# Patient Record
Sex: FEMALE | Race: WHITE | URBAN - METROPOLITAN AREA
[De-identification: names, ages, dates, MRNs, and addresses within clinical notes are randomized per-mention and may not be internally consistent; named-entity substitution may affect disease eponyms.]

---

## 2020-07-02 ENCOUNTER — OFFICE VISIT CONVERTED (OUTPATIENT)
Dept: CARDIOLOGY | Facility: CLINIC | Age: 85
End: 2020-07-02
Attending: SPECIALIST

## 2021-05-10 NOTE — H&P
History and Physical      Patient Name: Barbara Grewal   Patient ID: [id number]   Sex: Female   YOB: 1932        Visit Date: July 2, 2020    Provider: Benjamin Delong MD   Location: [facility]   Location Address: [address] [place] [address], [facility]   [place], [place]  [id number]   Location Phone: [phone]          Chief Complaint  · Atrial fibrillation  · Weakness      History Of Present Illness  Consult requested by: ALL CARE PROVIDER NAME   Barbara Grewal is a 88 year old female recently in [facility] with shortness of breath was found to be in atrial fibrillation with a controlled heart rate. No chest pain. She complains of feeling fatigue and weakness. She has shortness of breath on exertion.   PAST MEDICAL HISTORY: Positive for diabetes. Hypertension, arthritis and atrial fibrillation.   FAMILY HISTORY: unknown.   PSYCHOSOCIAL HISTORY: Negative for depression and mood changes. Patient is . Does not drink alcohol and uses snuff.   CURRENT MEDICATIONS: include Trospium Chloride 60 mg qd; Raberprazole 20 mg bid; Montelukast 10 mg qd; Sertraline 50 mg qd; Nitrofurantoin 25 mg qd; Enalapril 2.5 mg qd; Hydroxyzine 50 mg qd; Ergocalciferol 50,000 unit q week; Janumet /1000 mg qd; ASA 81 mg qd;  mg qd; Os-anjali 1250 mg bid; Prednisone 5 mg qd; Ferrous Sulfate 325 mg qd; Eliquis 2.5 mg bid; Metoprolol Succ 265 mg 1/2 tablet qd; Levothyroxine 100 mcg qd. The dosage and frequency of the medications were reviewed with the patient.   ALLERGIES: Penicillin.   CHOLESTEROL STATUS: Unknown.       Review of Systems  · Constitutional  o Admits  o : good general health lately; fatigue  o Denies  o : recent weight changes  · Eyes  o Admits  o : blurred or double vision  · HENT  o Admits  o : hearing loss or ringing  o Denies  o : chronic sinus problem, swollen glands in neck  · Cardiovascular  o Admits  o : palpitations, swelling feet, hands, ankles; shortness of  "breath   o Denies  o : chest pain  · Respiratory  o Admits  o : asthma or wheezing  o Denies  o : COPD  · Gastrointestinal  o Denies  o : ulcers, nausea or vomiting  · Neurologic  o Denies  o : lightheaded or dizzy, stroke, headaches  · Musculoskeletal  o Admits  o : joint pain  o Denies  o : back pain  · Endocrine  o Admits  o : diabetes and hypothyroidism; excessive thirst or urination  o Denies  o : heat or cold intolerance  · Heme-Lymph  o Denies  o : bleeding or bruising tendency, anemia      Vitals  Date Time BP Position Site L\R Cuff Size HR RR TEMP (F) WT  HT  BMI kg/m2 BSA m2 O2 Sat HC       07/02/2020 01:37 /57 Sitting    57 - R   159lbs 0oz 5'  1\" 30.04 1.76           Physical Examination  · Constitutional  o Appearance  o : Awake, alert, cooperative, pleasant.  · Eyes  o Pupils and Irises  o : Pupils equal and reacting to light and accommodation.  · Ears, Nose, Mouth and Throat  o Ears  o : Tympanic membranes are normal.  o Nose  o : Clear with no maxillary tenderness.  o Oral Cavity  o : Clear.  · Neck  o Inspection/Palpation  o : No JVD, bruits, thyromegaly, or adenopathy. Trachea midline. No axillary or cervical lymphadenopathy.  o Thyroid  o : No thyroid enlargement.   · Respiratory  o Inspection of Chest  o : No chest wall deformities, moving equal.  o Auscultation of Lungs  o : Good air entry with vesicular breath sounds.  o Percussion of Chest  o : Resonant bilaterally.   o Palpation of Chest  o : Equal movements bilaterally.  · Cardiovascular  o Heart  o :   § Auscultation of Heart  § : PMI is in the 5th intercostal space. S1 and S2 regular. No S3. No S4. No murmurs.  o Peripheral Vascular System  o :   § Extremities  § : No pedal edema. Peripheral pulses well felt. No cyanosis.  · Gastrointestinal  o Abdominal Examination  o : No organomegaly, masses, tenderness, bruits, or abnormal pulsations. Bowel sounds are normal.  · Musculoskeletal  o General  o : Muscle strength is normal with " normal tone.  · Skin and Subcutaneous Tissue  o General Inspection  o : No rash, petechiae, or suspicious skin lesions. Skin turgor is normal.          Assessment     IMPRESSION/PLAN    1. Permanent atrial fibrillation with a controlled heart rate. Continue current dose of Eliquis for stroke prevention. Risks and benefits of anticoagulation discussed with the patient and her family. Continue current dose of Metoprolol for rate control. Will do an echocardiogram to evaluate left ventricle systolic function.  2. Essential hypertension controlled. Continue current dose of Metoprolol. Monitor blood pressure regularly.          Benjamin Delong MD  MARYAN/wt     Problems Reconciled         Electronically Signed by: Jacqueline Allan-, -Author on July 8, 2020 01:41:40 PM  Electronically Co-signed by: Benjamin Delong MD -Reviewer on July 13, 2020 08:36:50 AM

## 2021-05-15 VITALS
HEART RATE: 57 BPM | HEIGHT: 61 IN | SYSTOLIC BLOOD PRESSURE: 116 MMHG | WEIGHT: 159 LBS | BODY MASS INDEX: 30.02 KG/M2 | DIASTOLIC BLOOD PRESSURE: 57 MMHG